# Patient Record
Sex: FEMALE | Race: WHITE | Employment: FULL TIME | URBAN - METROPOLITAN AREA
[De-identification: names, ages, dates, MRNs, and addresses within clinical notes are randomized per-mention and may not be internally consistent; named-entity substitution may affect disease eponyms.]

---

## 2017-07-19 ENCOUNTER — HOSPITAL ENCOUNTER (OUTPATIENT)
Dept: ULTRASOUND IMAGING | Age: 27
Discharge: HOME | End: 2017-07-19
Payer: COMMERCIAL

## 2017-07-19 DIAGNOSIS — R11.0 NAUSEA: ICD-10-CM

## 2017-07-19 PROCEDURE — 76700 US EXAM ABDOM COMPLETE: CPT

## 2020-04-19 ENCOUNTER — ANESTHESIA (OUTPATIENT)
Dept: SURGERY | Age: 30
End: 2020-04-19
Payer: COMMERCIAL

## 2020-04-19 ENCOUNTER — APPOINTMENT (OUTPATIENT)
Dept: GENERAL RADIOLOGY | Age: 30
End: 2020-04-19
Attending: EMERGENCY MEDICINE
Payer: COMMERCIAL

## 2020-04-19 ENCOUNTER — HOSPITAL ENCOUNTER (EMERGENCY)
Age: 30
Discharge: HOME | End: 2020-04-20
Attending: EMERGENCY MEDICINE
Payer: COMMERCIAL

## 2020-04-19 ENCOUNTER — ANESTHESIA EVENT (OUTPATIENT)
Dept: SURGERY | Age: 30
End: 2020-04-19
Payer: COMMERCIAL

## 2020-04-19 ENCOUNTER — APPOINTMENT (OUTPATIENT)
Dept: CT IMAGING | Age: 30
End: 2020-04-19
Attending: EMERGENCY MEDICINE
Payer: COMMERCIAL

## 2020-04-19 DIAGNOSIS — T17.208A FOREIGN BODY IN PHARYNX, INITIAL ENCOUNTER: Primary | ICD-10-CM

## 2020-04-19 LAB
ALBUMIN SERPL-MCNC: 3.9 G/DL (ref 3.5–5)
ALBUMIN/GLOB SERPL: 1.1 {RATIO} (ref 1.1–2.2)
ALP SERPL-CCNC: 59 U/L (ref 45–117)
ALT SERPL-CCNC: 17 U/L (ref 12–78)
ANION GAP SERPL CALC-SCNC: 12 MMOL/L (ref 5–15)
AST SERPL-CCNC: 12 U/L (ref 15–37)
BASOPHILS # BLD: 0 K/UL (ref 0–0.1)
BASOPHILS NFR BLD: 1 % (ref 0–1)
BILIRUB SERPL-MCNC: 0.6 MG/DL (ref 0.2–1)
BUN SERPL-MCNC: 15 MG/DL (ref 6–20)
BUN/CREAT SERPL: 15 (ref 12–20)
CALCIUM SERPL-MCNC: 8.9 MG/DL (ref 8.5–10.1)
CHLORIDE SERPL-SCNC: 108 MMOL/L (ref 97–108)
CO2 SERPL-SCNC: 25 MMOL/L (ref 21–32)
COMMENT, HOLDF: NORMAL
CREAT SERPL-MCNC: 0.98 MG/DL (ref 0.55–1.02)
DIFFERENTIAL METHOD BLD: NORMAL
EOSINOPHIL # BLD: 0.2 K/UL (ref 0–0.4)
EOSINOPHIL NFR BLD: 3 % (ref 0–7)
ERYTHROCYTE [DISTWIDTH] IN BLOOD BY AUTOMATED COUNT: 13.1 % (ref 11.5–14.5)
GLOBULIN SER CALC-MCNC: 3.7 G/DL (ref 2–4)
GLUCOSE SERPL-MCNC: 103 MG/DL (ref 65–100)
HCG UR QL: NEGATIVE
HCT VFR BLD AUTO: 39.5 % (ref 35–47)
HGB BLD-MCNC: 13.2 G/DL (ref 11.5–16)
IMM GRANULOCYTES # BLD AUTO: 0 K/UL (ref 0–0.04)
IMM GRANULOCYTES NFR BLD AUTO: 0 % (ref 0–0.5)
LYMPHOCYTES # BLD: 1.8 K/UL (ref 0.8–3.5)
LYMPHOCYTES NFR BLD: 33 % (ref 12–49)
MCH RBC QN AUTO: 28.4 PG (ref 26–34)
MCHC RBC AUTO-ENTMCNC: 33.4 G/DL (ref 30–36.5)
MCV RBC AUTO: 84.9 FL (ref 80–99)
MONOCYTES # BLD: 0.4 K/UL (ref 0–1)
MONOCYTES NFR BLD: 7 % (ref 5–13)
NEUTS SEG # BLD: 3.1 K/UL (ref 1.8–8)
NEUTS SEG NFR BLD: 56 % (ref 32–75)
NRBC # BLD: 0 K/UL (ref 0–0.01)
NRBC BLD-RTO: 0 PER 100 WBC
PLATELET # BLD AUTO: 281 K/UL (ref 150–400)
PMV BLD AUTO: 9.8 FL (ref 8.9–12.9)
POTASSIUM SERPL-SCNC: 3.7 MMOL/L (ref 3.5–5.1)
PROT SERPL-MCNC: 7.6 G/DL (ref 6.4–8.2)
RBC # BLD AUTO: 4.65 M/UL (ref 3.8–5.2)
SAMPLES BEING HELD,HOLD: NORMAL
SODIUM SERPL-SCNC: 145 MMOL/L (ref 136–145)
WBC # BLD AUTO: 5.4 K/UL (ref 3.6–11)

## 2020-04-19 PROCEDURE — 80053 COMPREHEN METABOLIC PANEL: CPT

## 2020-04-19 PROCEDURE — 76010000138 HC OR TIME 0.5 TO 1 HR: Performed by: OTOLARYNGOLOGY

## 2020-04-19 PROCEDURE — 77030026438 HC STYL ET INTUB CARD -A: Performed by: ANESTHESIOLOGY

## 2020-04-19 PROCEDURE — 85025 COMPLETE CBC W/AUTO DIFF WBC: CPT

## 2020-04-19 PROCEDURE — 99284 EMERGENCY DEPT VISIT MOD MDM: CPT

## 2020-04-19 PROCEDURE — 81025 URINE PREGNANCY TEST: CPT

## 2020-04-19 PROCEDURE — 76060000032 HC ANESTHESIA 0.5 TO 1 HR: Performed by: OTOLARYNGOLOGY

## 2020-04-19 PROCEDURE — 74011250636 HC RX REV CODE- 250/636: Performed by: NURSE ANESTHETIST, CERTIFIED REGISTERED

## 2020-04-19 PROCEDURE — 70490 CT SOFT TISSUE NECK W/O DYE: CPT

## 2020-04-19 PROCEDURE — 70360 X-RAY EXAM OF NECK: CPT

## 2020-04-19 PROCEDURE — 77030008684 HC TU ET CUF COVD -B: Performed by: ANESTHESIOLOGY

## 2020-04-19 PROCEDURE — 71045 X-RAY EXAM CHEST 1 VIEW: CPT

## 2020-04-19 PROCEDURE — 36415 COLL VENOUS BLD VENIPUNCTURE: CPT

## 2020-04-19 PROCEDURE — 76210000006 HC OR PH I REC 0.5 TO 1 HR: Performed by: OTOLARYNGOLOGY

## 2020-04-19 PROCEDURE — 76210000021 HC REC RM PH II 0.5 TO 1 HR: Performed by: OTOLARYNGOLOGY

## 2020-04-19 RX ORDER — SODIUM CHLORIDE, SODIUM LACTATE, POTASSIUM CHLORIDE, CALCIUM CHLORIDE 600; 310; 30; 20 MG/100ML; MG/100ML; MG/100ML; MG/100ML
125 INJECTION, SOLUTION INTRAVENOUS CONTINUOUS
Status: DISCONTINUED | OUTPATIENT
Start: 2020-04-19 | End: 2020-04-20 | Stop reason: HOSPADM

## 2020-04-19 RX ORDER — AMOXICILLIN 400 MG/5ML
10 POWDER, FOR SUSPENSION ORAL 2 TIMES DAILY
Qty: 200 ML | Refills: 0 | Status: SHIPPED | OUTPATIENT
Start: 2020-04-19 | End: 2020-04-29

## 2020-04-19 RX ORDER — MIDAZOLAM HYDROCHLORIDE 1 MG/ML
1 INJECTION, SOLUTION INTRAMUSCULAR; INTRAVENOUS AS NEEDED
Status: DISCONTINUED | OUTPATIENT
Start: 2020-04-19 | End: 2020-04-19 | Stop reason: HOSPADM

## 2020-04-19 RX ORDER — SODIUM CHLORIDE 0.9 % (FLUSH) 0.9 %
5-40 SYRINGE (ML) INJECTION AS NEEDED
Status: DISCONTINUED | OUTPATIENT
Start: 2020-04-19 | End: 2020-04-20 | Stop reason: HOSPADM

## 2020-04-19 RX ORDER — LEVOTHYROXINE SODIUM 50 UG/1
50 TABLET ORAL
COMMUNITY

## 2020-04-19 RX ORDER — SODIUM CHLORIDE 0.9 % (FLUSH) 0.9 %
5-40 SYRINGE (ML) INJECTION AS NEEDED
Status: DISCONTINUED | OUTPATIENT
Start: 2020-04-19 | End: 2020-04-19 | Stop reason: HOSPADM

## 2020-04-19 RX ORDER — DEXAMETHASONE SODIUM PHOSPHATE 4 MG/ML
INJECTION, SOLUTION INTRA-ARTICULAR; INTRALESIONAL; INTRAMUSCULAR; INTRAVENOUS; SOFT TISSUE AS NEEDED
Status: DISCONTINUED | OUTPATIENT
Start: 2020-04-19 | End: 2020-04-19 | Stop reason: HOSPADM

## 2020-04-19 RX ORDER — FENTANYL CITRATE 50 UG/ML
50 INJECTION, SOLUTION INTRAMUSCULAR; INTRAVENOUS AS NEEDED
Status: DISCONTINUED | OUTPATIENT
Start: 2020-04-19 | End: 2020-04-19 | Stop reason: HOSPADM

## 2020-04-19 RX ORDER — FENTANYL CITRATE 50 UG/ML
25 INJECTION, SOLUTION INTRAMUSCULAR; INTRAVENOUS
Status: DISCONTINUED | OUTPATIENT
Start: 2020-04-19 | End: 2020-04-20 | Stop reason: HOSPADM

## 2020-04-19 RX ORDER — SODIUM CHLORIDE, SODIUM LACTATE, POTASSIUM CHLORIDE, CALCIUM CHLORIDE 600; 310; 30; 20 MG/100ML; MG/100ML; MG/100ML; MG/100ML
INJECTION, SOLUTION INTRAVENOUS
Status: DISCONTINUED | OUTPATIENT
Start: 2020-04-19 | End: 2020-04-19 | Stop reason: HOSPADM

## 2020-04-19 RX ORDER — MIDAZOLAM HYDROCHLORIDE 1 MG/ML
0.5 INJECTION, SOLUTION INTRAMUSCULAR; INTRAVENOUS
Status: DISCONTINUED | OUTPATIENT
Start: 2020-04-19 | End: 2020-04-20 | Stop reason: HOSPADM

## 2020-04-19 RX ORDER — SODIUM CHLORIDE 9 MG/ML
25 INJECTION, SOLUTION INTRAVENOUS CONTINUOUS
Status: DISCONTINUED | OUTPATIENT
Start: 2020-04-19 | End: 2020-04-19 | Stop reason: HOSPADM

## 2020-04-19 RX ORDER — PROPOFOL 10 MG/ML
INJECTION, EMULSION INTRAVENOUS AS NEEDED
Status: DISCONTINUED | OUTPATIENT
Start: 2020-04-19 | End: 2020-04-19 | Stop reason: HOSPADM

## 2020-04-19 RX ORDER — SODIUM CHLORIDE 0.9 % (FLUSH) 0.9 %
5-40 SYRINGE (ML) INJECTION EVERY 8 HOURS
Status: DISCONTINUED | OUTPATIENT
Start: 2020-04-19 | End: 2020-04-20 | Stop reason: HOSPADM

## 2020-04-19 RX ORDER — FENTANYL CITRATE 50 UG/ML
INJECTION, SOLUTION INTRAMUSCULAR; INTRAVENOUS AS NEEDED
Status: DISCONTINUED | OUTPATIENT
Start: 2020-04-19 | End: 2020-04-19 | Stop reason: HOSPADM

## 2020-04-19 RX ORDER — MORPHINE SULFATE 10 MG/ML
2 INJECTION, SOLUTION INTRAMUSCULAR; INTRAVENOUS
Status: DISCONTINUED | OUTPATIENT
Start: 2020-04-19 | End: 2020-04-20 | Stop reason: HOSPADM

## 2020-04-19 RX ORDER — SODIUM CHLORIDE 0.9 % (FLUSH) 0.9 %
5-40 SYRINGE (ML) INJECTION EVERY 8 HOURS
Status: DISCONTINUED | OUTPATIENT
Start: 2020-04-19 | End: 2020-04-19 | Stop reason: HOSPADM

## 2020-04-19 RX ORDER — HYDROMORPHONE HYDROCHLORIDE 1 MG/ML
0.2 INJECTION, SOLUTION INTRAMUSCULAR; INTRAVENOUS; SUBCUTANEOUS
Status: DISCONTINUED | OUTPATIENT
Start: 2020-04-19 | End: 2020-04-20 | Stop reason: HOSPADM

## 2020-04-19 RX ORDER — DIPHENHYDRAMINE HYDROCHLORIDE 50 MG/ML
12.5 INJECTION, SOLUTION INTRAMUSCULAR; INTRAVENOUS AS NEEDED
Status: ACTIVE | OUTPATIENT
Start: 2020-04-19 | End: 2020-04-19

## 2020-04-19 RX ORDER — ACETAMINOPHEN 325 MG/1
650 TABLET ORAL ONCE
Status: DISCONTINUED | OUTPATIENT
Start: 2020-04-19 | End: 2020-04-19 | Stop reason: HOSPADM

## 2020-04-19 RX ORDER — OXYCODONE HYDROCHLORIDE 5 MG/1
5 TABLET ORAL AS NEEDED
Status: DISCONTINUED | OUTPATIENT
Start: 2020-04-19 | End: 2020-04-20 | Stop reason: HOSPADM

## 2020-04-19 RX ORDER — ONDANSETRON 2 MG/ML
4 INJECTION INTRAMUSCULAR; INTRAVENOUS AS NEEDED
Status: DISCONTINUED | OUTPATIENT
Start: 2020-04-19 | End: 2020-04-20 | Stop reason: HOSPADM

## 2020-04-19 RX ORDER — SODIUM CHLORIDE, SODIUM LACTATE, POTASSIUM CHLORIDE, CALCIUM CHLORIDE 600; 310; 30; 20 MG/100ML; MG/100ML; MG/100ML; MG/100ML
25 INJECTION, SOLUTION INTRAVENOUS CONTINUOUS
Status: DISCONTINUED | OUTPATIENT
Start: 2020-04-19 | End: 2020-04-19 | Stop reason: HOSPADM

## 2020-04-19 RX ORDER — LIDOCAINE HYDROCHLORIDE 10 MG/ML
0.1 INJECTION, SOLUTION EPIDURAL; INFILTRATION; INTRACAUDAL; PERINEURAL AS NEEDED
Status: DISCONTINUED | OUTPATIENT
Start: 2020-04-19 | End: 2020-04-19 | Stop reason: HOSPADM

## 2020-04-19 RX ADMIN — FENTANYL CITRATE 25 MCG: 50 INJECTION, SOLUTION INTRAMUSCULAR; INTRAVENOUS at 23:19

## 2020-04-19 RX ADMIN — PROPOFOL 200 MG: 10 INJECTION, EMULSION INTRAVENOUS at 22:33

## 2020-04-19 RX ADMIN — PROPOFOL 100 MG: 10 INJECTION, EMULSION INTRAVENOUS at 22:38

## 2020-04-19 RX ADMIN — SODIUM CHLORIDE, POTASSIUM CHLORIDE, SODIUM LACTATE AND CALCIUM CHLORIDE: 600; 310; 30; 20 INJECTION, SOLUTION INTRAVENOUS at 22:27

## 2020-04-19 RX ADMIN — FENTANYL CITRATE 50 MCG: 50 INJECTION, SOLUTION INTRAMUSCULAR; INTRAVENOUS at 22:49

## 2020-04-19 RX ADMIN — PROPOFOL 200 MG: 10 INJECTION, EMULSION INTRAVENOUS at 22:28

## 2020-04-19 RX ADMIN — DEXAMETHASONE SODIUM PHOSPHATE 4 MG: 4 INJECTION, SOLUTION INTRAMUSCULAR; INTRAVENOUS at 22:56

## 2020-04-19 RX ADMIN — FENTANYL CITRATE 25 MCG: 50 INJECTION, SOLUTION INTRAMUSCULAR; INTRAVENOUS at 23:23

## 2020-04-19 NOTE — ED TRIAGE NOTES
Patient was eating grilled chicken, and feels as though something is stuck in her throat. Reports pain in her throat, which makes her not want to swallow, but is able to swallow her saliva.

## 2020-04-19 NOTE — ED NOTES
Report and appropriate paperwork provided to [facility] crew; patient in no distress leaving department on stretcher.

## 2020-04-19 NOTE — ED NOTES
TRANSFER - OUT REPORT:    Verbal report given to Edwige Lopes RN (name) on Devan Jolly  being transferred to ER(unit) for routine progression of care       Report consisted of patients Situation, Background, Assessment and   Recommendations(SBAR). Information from the following report(s) SBAR, ED Summary and Intake/Output was reviewed with the receiving nurse. Lines:   Peripheral IV 04/19/20 Left Forearm (Active)   Site Assessment Clean, dry, & intact 4/19/2020  6:45 PM   Phlebitis Assessment 0 4/19/2020  6:45 PM   Infiltration Assessment 0 4/19/2020  6:45 PM   Dressing Status Clean, dry, & intact 4/19/2020  6:45 PM   Dressing Type Transparent 4/19/2020  6:45 PM   Hub Color/Line Status Pink;Flushed 4/19/2020  6:45 PM   Action Taken Blood drawn 4/19/2020  6:45 PM        Opportunity for questions and clarification was provided.

## 2020-04-19 NOTE — ED PROVIDER NOTES
Please note that this dictation was completed with [employer], the computer voice recognition software.  Quite often unanticipated grammatical, syntax, homophones, and other interpretive errors are inadvertently transcribed by the computer software.  Please disregard these errors.  Please excuse any errors that have escaped final proofreading. 26-year-old female is currently breast-feeding \"was eating chicken breast my  grilled the very first bite something suddenly got stuck in my throat I can feel it it still there now it feels like it is back on the right side back by behind the base of my tongue. I cannot reach it if you like I can almost reach it with my finger but I could not so decided to come here. Is not taking anything for this she has not had previous episodes of this was concerned initially it may have been a chicken bone however \"there are no bones and chicken breasts. \"  Patient drove herself to the ER.    pt denies HA, vison changes, diff swallowing, CP, SOB, Abd pain, F/Ch, N/V, D/Cons or other current systemic complaints           No past medical history on file. No past surgical history on file. No family history on file.     Social History     Socioeconomic History    Marital status: SINGLE     Spouse name: Not on file    Number of children: Not on file    Years of education: Not on file    Highest education level: Not on file   Occupational History    Not on file   Social Needs    Financial resource strain: Not on file    Food insecurity     Worry: Not on file     Inability: Not on file    Transportation needs     Medical: Not on file     Non-medical: Not on file   Tobacco Use    Smoking status: Not on file   Substance and Sexual Activity    Alcohol use: Not on file    Drug use: Not on file    Sexual activity: Not on file   Lifestyle    Physical activity     Days per week: Not on file     Minutes per session: Not on file    Stress: Not on file   Relationships    Social connections     Talks on phone: Not on file     Gets together: Not on file     Attends [employer] service: Not on file     Active member of club or organization: Not on file     Attends meetings of clubs or organizations: Not on file     Relationship status: Not on file    Intimate partner violence     Fear of current or ex partner: Not on file     Emotionally abused: Not on file     Physically abused: Not on file     Forced sexual activity: Not on file   Other Topics Concern    Not on file   Social History Narrative    Not on file         ALLERGIES: Patient has no allergy information on record. Review of Systems   Constitutional: Negative for appetite change, chills and fever. HENT: Positive for sore throat. Negative for trouble swallowing and voice change. Eyes: Negative for visual disturbance. Respiratory: Negative for cough, chest tightness and shortness of breath. Cardiovascular: Negative for chest pain, palpitations and leg swelling. Gastrointestinal: Negative for abdominal pain, diarrhea, nausea and vomiting. Genitourinary: Negative for dysuria. Skin: Negative for rash. Neurological: Negative for facial asymmetry and speech difficulty. All other systems reviewed and are negative. There were no vitals filed for this visit. Physical Exam  Vitals signs and nursing note reviewed. Constitutional:       General: She is not in acute distress. Appearance: Normal appearance. She is well-developed. She is not ill-appearing, toxic-appearing or diaphoretic. Comments: NAD, AxOx4, speaking in complete sentences    Airway intact  No stridor, swelling/ controlling secrections   HENT:      Head: Normocephalic and atraumatic. Comments: Cn intact    Unable to visualize FB/ wound/ bleeding;      Right Ear: External ear normal.      Left Ear: External ear normal.      Nose: Nose normal. No congestion.       Mouth/Throat:      Mouth: Mucous membranes are moist. Pharynx: Oropharynx is clear. No oropharyngeal exudate or posterior oropharyngeal erythema. Eyes:      General: No scleral icterus. Right eye: No discharge. Conjunctiva/sclera: Conjunctivae normal.      Pupils: Pupils are equal, round, and reactive to light. Neck:      Musculoskeletal: Normal range of motion and neck supple. Vascular: No JVD. Trachea: No tracheal deviation. Cardiovascular:      Rate and Rhythm: Normal rate and regular rhythm. Pulses: Normal pulses. Heart sounds: Normal heart sounds. No murmur. No friction rub. No gallop. Pulmonary:      Effort: Pulmonary effort is normal. No respiratory distress. Breath sounds: Normal breath sounds. No stridor. No wheezing, rhonchi or rales. Chest:      Chest wall: No tenderness. Abdominal:      General: Bowel sounds are normal.      Palpations: Abdomen is soft. There is no mass. Tenderness: There is no abdominal tenderness. There is no guarding or rebound. Hernia: No hernia is present. Genitourinary:     Vagina: No vaginal discharge. Musculoskeletal: Normal range of motion. General: No swelling, tenderness, deformity or signs of injury. Right lower leg: No edema. Left lower leg: No edema. Skin:     General: Skin is warm and dry. Capillary Refill: Capillary refill takes less than 2 seconds. Coloration: Skin is not jaundiced or pale. Findings: No bruising, erythema, lesion or rash. Neurological:      General: No focal deficit present. Mental Status: She is alert and oriented to person, place, and time. Cranial Nerves: No cranial nerve deficit. Sensory: No sensory deficit. Motor: No weakness or abnormal muscle tone.       Coordination: Coordination normal.      Gait: Gait normal.      Deep Tendon Reflexes: Reflexes normal.      Comments: pt has motor/ CV/ Sensation grossly intact to all extremities, R = L in strength;   Psychiatric:         Behavior: Behavior normal.         Thought Content: Thought content normal.          MDM       Procedures    CONSULT  NOTE  6:45 PM  Holden Gamino MD spoke with Dr Jaz Gomez. Specialty: ENT  Discussed pt's hx, disposition, and available diagnostic and imaging results. Reviewed care plans. Consulting physician agrees with plans as outlined 'Please transfer to [facility] obtain the CT there/ we will see her there' . 6:50 PM  Dr Aravind Hendrix aware/ accepts the Pt in transfer;        9:47 PM   Case, imaging results, and plan discussed with Dr. Jaz Gomez, ENT. Patient to operating room for management of her foreign body. Patient is being admitted to the hospital.  The results of their tests and reasons for their admission have been discussed with them and/or available family. They convey agreement and understanding for the need to be admitted and for their admission diagnosis. Consultation will be made now with the inpatient physician for hospitalization.

## 2020-04-20 VITALS
BODY MASS INDEX: 23.32 KG/M2 | TEMPERATURE: 98 F | WEIGHT: 148.59 LBS | HEIGHT: 67 IN | SYSTOLIC BLOOD PRESSURE: 116 MMHG | DIASTOLIC BLOOD PRESSURE: 72 MMHG | RESPIRATION RATE: 14 BRPM | OXYGEN SATURATION: 99 % | HEART RATE: 67 BPM

## 2020-04-20 NOTE — CONSULTS
Ears/Nose/Throat Consult    Subjective:     Date of Consultation:  2020    Referring Physician: Sugey Medeiros    History of Present Illness:   Patient is a 26 y.o.  female who is being seen for foreign body hypopharynx on the right. she  was admitted to the hosptial for evaluation, imaging, removal as indicated. Past Medical History:   Diagnosis Date    Hypothyroidism       History reviewed. No pertinent family history. Social History     Tobacco Use    Smoking status: Never Smoker    Smokeless tobacco: Never Used   Substance Use Topics    Alcohol use: Never     Frequency: Never     Past Surgical History:   Procedure Laterality Date    HX  SECTION      HX TONSILLECTOMY        No current facility-administered medications for this encounter. Current Outpatient Medications   Medication Sig    levothyroxine (SYNTHROID) 50 mcg tablet Take 50 mcg by mouth Daily (before breakfast).  PNV85-iron-folic acid-dha-fish 53-41-2-320 mg cap Take  by mouth. Allergies   Allergen Reactions    Codeine Nausea and Vomiting        Review of Systems: chart reviewed    Objective:     Patient Vitals for the past 8 hrs:   BP Temp Pulse Resp SpO2 Height Weight   20 146/79 97.5 °F (36.4 °C) 67 18 97 %     20 1931 115/78 97.8 °F (36.6 °C) 68 18 98 %     20 1815 110/77 97 °F (36.1 °C) 65 16 96 % 5' 7\" (1.702 m) 67.4 kg (148 lb 9.4 oz)     Temp (24hrs), Av.4 °F (36.3 °C), Min:97 °F (36.1 °C), Max:97.8 °F (36.6 °C)    No intake/output data recorded. Assessment:     Foreign body right prevertebral tissue in hypopharynx. Recommend removal in OR. Plan:     CT , lab reviewed. Care coordinated with nursing supervisor, OR team. Foreign body right prevertebral tissue in hypopharynx. Recommend removal in OR.        Signed By: Philomena Edwards MD     2020

## 2020-04-20 NOTE — H&P
Ears/Nose/Throat Consult    Subjective:     Date of Evaluation:  2020        History of Present Illness:   Patient is a 26 y. o.female who is being seen for foreign body throat. [facility] Past Medical History:   Diagnosis Date    Hypothyroidism       History reviewed. No pertinent family history.    Social History     Tobacco Use    Smoking status: Never Smoker    Smokeless tobacco: Never Used   Substance Use Topics    Alcohol use: Never     Frequency: Never     Past Surgical History:   Procedure Laterality Date    HX  SECTION      HX TONSILLECTOMY        Current Facility-Administered Medications   Medication Dose Route Frequency    lactated Ringers infusion  25 mL/hr IntraVENous CONTINUOUS    0.9% sodium chloride infusion  25 mL/hr IntraVENous CONTINUOUS    sodium chloride (NS) flush 5-40 mL  5-40 mL IntraVENous Q8H    sodium chloride (NS) flush 5-40 mL  5-40 mL IntraVENous PRN    lidocaine (PF) (XYLOCAINE) 10 mg/mL (1 %) injection 0.1 mL  0.1 mL SubCUTAneous PRN    fentaNYL citrate (PF) injection 50 mcg  50 mcg IntraVENous PRN    midazolam (VERSED) injection 1 mg  1 mg IntraVENous PRN    midazolam (VERSED) injection 1 mg  1 mg IntraVENous PRN    acetaminophen (TYLENOL) tablet 650 mg  650 mg Oral ONCE    lactated Ringers infusion  125 mL/hr IntraVENous CONTINUOUS    sodium chloride (NS) flush 5-40 mL  5-40 mL IntraVENous Q8H    sodium chloride (NS) flush 5-40 mL  5-40 mL IntraVENous PRN    oxyCODONE IR (ROXICODONE) tablet 5 mg  5 mg Oral PRN    fentaNYL citrate (PF) injection 25 mcg  25 mcg IntraVENous Multiple    morphine 10 mg/ml injection 2 mg  2 mg IntraVENous Multiple    HYDROmorphone (PF) (DILAUDID) injection 0.2 mg  0.2 mg IntraVENous Multiple    ondansetron (ZOFRAN) injection 4 mg  4 mg IntraVENous PRN    midazolam (VERSED) injection 0.5 mg  0.5 mg IntraVENous Q5MIN PRN    diphenhydrAMINE (BENADRYL) injection 12.5 mg  12.5 mg IntraVENous PRN    meperidine (DEMEROL) injection 12.5 mg  12.5 mg IntraVENous PRN     Facility-Administered Medications Ordered in Other Encounters   Medication Dose Route Frequency    fentaNYL citrate (PF) injection    PRN    propofoL (DIPRIVAN) 10 mg/mL injection   IntraVENous PRN    dexamethasone (DECADRON) 4 mg/mL injection    PRN    lactated Ringers infusion   IntraVENous CONTINUOUS      Allergies   Allergen Reactions    Codeine Nausea and Vomiting        Review of Systems:  Dysphagia, odynophagia, no chest pain, no hemoptysis, no hematemisis, no stridor    Objective:     Patient Vitals for the past 8 hrs:   BP Temp Pulse Resp SpO2 Height Weight   20 102/72 97.5 °F (36.4 °C) 63 17 97 %     20 146/79 97.5 °F (36.4 °C) 67 18 97 %     20 1931 115/78 97.8 °F (36.6 °C) 68 18 98 %     20 1815 110/77 97 °F (36.1 °C) 65 16 96 % 5' 7\" (1.702 m) 67.4 kg (148 lb 9.4 oz)     Temp (24hrs), Av.5 °F (36.4 °C), Min:97 °F (36.1 °C), Max:97.8 °F (36.6 °C)    No intake/output data recorded. Physical Exam:   General  General Appearance- Well nourished adult in no apparent distress who is alert and cooperative. Gait- Normal. Voice- Normal voice and communication. HEENT  Head: Normally developed without evidence of trauma or lesions. Face:  Skin:  Normal color, texture, and turgor. There are no lesions of concern nor swelling or induration. Lips- normal.  Facial Nerve- Bilateral- function is equal and symmetrical with no deficit. Ear: Auricle- Left- Normal development without sinus pit, cyst or any other lesion. Right- Normal development without sinus pit, cyst or any other lesion  Auditory Canal (otoscopic examination)  Left- clean, without edema, discharge, or lesions. Right  clean, without edema, discharge, or lesions. Mastoid- Left- No erythema, edema, tenderness, or protrusion of the auricle.   Right- No erythema, edema, tenderness, or protrusion of the auricle. Nose: External Nose- Normally developed without lesion. Nasal Mucosa- moist and pink without erythema, edema, or lesions. Nasal septum- Caudally the septum is relatively straight without lesion. Turbinates- Bilateral- The turbinates are without hypertrophy, edema, or lesion. Sinuses-  All sinuses are nontender to percussion. Oral Cavity/OropharynxDentition- Normal dentition for age witho no evidence of discoloration, inflammation, or infection. Oral Mucosa: moist without erythema or lesions. Tongue- no lesions or palpable masses. Floor of mouth- soft without palpable masses or mucosal lesion. Palate and uvula- Palate is without cleft and the uvula is not bifid. Soft palate elevates symmetrically. Tonsils- Bilateral -Normal in size without exudates or erythema. Salivary Ducts-  Ducts appear to be normal.  Throat: Pharynx- Normal mucosal lining without erythema or mass. Larynx: difficult to examine directly. Neck:-- Trachea- midline with normal laryngeal framework with no crepitus. Salivary Glands- normal to palpation without nodules or tenderness. Thyroid- normal to palpation without mass or irregularity. Lymph Nodes- No palpable adenopathy or masses. Range of Motion- good in all directions, with good anterior-posterior and lateral flexion and rotation. Chest and Lung Exam: Normal respiratory effort with no wheezing, retractions, or rales. Cardiovascular: Regular rate and rhythm. Normal peripheral pulses without bruits. Neurologic exam: Alert and oriented to person, place, and time. Cranial Nerves II-XII intact bilaterally. Pupils equally round, reactive to light. Extraocular movements are intact bilaterally. No baseline nystagmus. Smooth pursuit grossly intact. Assessment:     Foreign body hypopharynx on right.  Likely metal wire    Hospital Problems  Date Reviewed: 4/19/2020          Codes Class Noted POA    Foreign body of throat ICD-10-CM: W99.868U  ICD-9-CM: 933.0 4/19/2020 Unknown                Plan: To or for removal of foreign body. Risks/benefits/imponderables/alternatives discussed with patient. Patient requests surgery.       Signed By: Kayleen lFores MD     April 19, 2020

## 2020-04-20 NOTE — BRIEF OP NOTE
Brief Postoperative Note    Patient: Nataliia Polk  YOB: 1990  MRN: [id number]    Date of Procedure: 4/19/2020     Pre-Op Diagnosis: FOREIGN OBJECT NEAR LARYNX    Post-Op Diagnosis:FOREIGN OBJECT NEAR LARYNX      Procedure(s):  LARYNGOSCOPY DIRECT RIGID REMOVAL OF FOREIGN OBJECT    Surgeon(s):  Juan Carter MD    Surgical Assistant: None    Anesthesia: General     Estimated Blood Loss (mL): 0cc    Complications:none    Specimens: * No specimens in log *     Implants: * No implants in log *    Drains: * No LDAs found *    Findings: foreign body removed hypopharynx. Patient tolerated the procedure well. Stable to recovery room.     Electronically Signed by Ren Bowen MD on 4/19/2020 at 11:17 PM

## 2020-04-20 NOTE — ED TRIAGE NOTES
Arrives as a transfer from [facility] ED after scan revealed foreign body in throat. Initial complaint was feeling something sharp stuck in her throat upon eating a piece of grilled chicken. Arrives a&o x 4, ambulatory, with no difficulty swallowing and VS at this time.

## 2020-04-20 NOTE — ANESTHESIA POSTPROCEDURE EVALUATION
Post-Anesthesia Evaluation and Assessment    Patient: Pablo Bobo MRN: [id number]  SSN: [id number]    YOB: 1990  Age: 26 y.o. Sex: female       Cardiovascular Function/Vital Signs  Visit Vitals  /70   Pulse 64   Temp 36.5 °C (97.7 °F)   Resp 12   Ht 5' 7\" (1.702 m)   Wt 67.4 kg (148 lb 9.4 oz)   SpO2 98%   BMI 23.27 kg/m²       Patient is status post General anesthesia for Procedure(s):  LARYNGOSCOPY DIRECT RIGID REMOVAL OF FOREIGN OBJECT. Nausea/Vomiting: None    Postoperative hydration reviewed and adequate. Pain:  Pain Scale 1: Numeric (0 - 10) (04/19/20 2322)  Pain Intensity 1: 4 (04/19/20 2322)   Managed    Neurological Status:   Neuro (WDL): Within Defined Limits (04/19/20 2321)   At baseline    Mental Status and Level of Consciousness: Alert and oriented to person, place, and time    Pulmonary Status:   O2 Device: Room air (04/19/20 2322)   Adequate oxygenation and airway patent    Complications related to anesthesia: None    Post-anesthesia assessment completed. No concerns    Signed By: Camille Opitz, MD     April 20, 2020              Procedure(s):  LARYNGOSCOPY DIRECT RIGID REMOVAL OF FOREIGN OBJECT. general    <BSHSIANPOST>    Vitals Value Taken Time   /70 4/19/2020 11:45 PM   Temp     Pulse 73 4/19/2020 11:55 PM   Resp 16 4/19/2020 11:55 PM   SpO2 96 % 4/19/2020 11:55 PM   Vitals shown include unvalidated device data.

## 2020-04-20 NOTE — DISCHARGE INSTRUCTIONS
Clear liquids 24 hours,  Advance to soft diet, regular as tolerated. Tylenol liquid for pain. Return to ER for increasing chest pain, increasing shortness of breath, high fever. If these symptoms occur during daytime hours, call our clinic nurses at [phone]. If there are concerns afterhours, please call [phone] and the answering service will help you contact the physician on call. Instructions Following Ambulatory Surgery    Activity  · As tolerated and directed by your doctor  · Bathe or shower as directed by your doctor    Diet  · Clear liquids for 24 hours then advance to soft diet, then to regular diet  · Advance to regular diet on second day, unless your doctor orders otherwise  · If nausea and vomiting continues, call your doctor    Pain  · Take pain medication as directed by your doctor, tylenol or ibuprofen for pain  ·  Call your doctor if pain is NOT relieved by medication  · DO NOT take aspirin or blood thinners until directed by your doctor      Follow-Up Phone Calls  · Will be made nursing staff  · If you have any problems, call your doctor as needed    Call your doctor if  · Excessive bleeding that does not stop  · Temperature of 101 degrees F or above,   · Shortness of breath or chest pain    After Anesthesia  · For the first 24 hours: DO NOT Drive, Drink alcoholic beverages, or Make important decisions  · Be aware of dizziness following anesthesia and while taking pain medication    Medication changes have been made by your doctor; see Universal Medication form  Continue home medications as previously prescribed with the addition of:     Other Instructions:    Appointment date/time: follow up in 1 week, but not necessary if feeling back to normal    Your doctor's phone number:  See above

## 2020-04-20 NOTE — OP NOTES
Operative Note    Patient: Jd Banda  YOB: 1990  MRN: [id number]    Date of Procedure: 4/19/2020     Pre-Op Diagnosis: FOREIGN OBJECT NEAR LARYNX    Post-Op Diagnosis:FOREIGN OBJECT NEAR LARYNX      Procedure(s):  LARYNGOSCOPY DIRECT RIGID REMOVAL OF FOREIGN OBJECT    A head wrap was placed. I palpated the tongue, floor of mouth, tonsils, and base of tongue. A maxillary tooth guard was inserted. I then performed a direct laryngoscopy with the laryngoscope. I examined the tonsils, base of tongue, valleculae, epiglottis, larynx, and pyriform sinuses. The foreign body was identified in the hypopharynx and was removed with cup forceps. Tamponade controlled minor oozing    Suction was used to remove any secretions or blood from the area and the subglottis. The laryngoscope was removed. The mouth guard was removed. The patient was extubated and stable to the recovery room. Specimine: none  Complications:none  Findings: foreign body removed hypopharynx. Patient tolerated the procedure well. Stable to recovery room.

## 2020-04-20 NOTE — PROGRESS NOTES
Foreign body idenitified hypopharynx, removed with direct visualization. Plan to recovery, d/c when criteria met.

## 2020-04-20 NOTE — ED NOTES
TRANSFER - OUT REPORT:    Verbal report given to [facility], RN(name) on Delene Barges  being transferred to OR(unit) for ordered procedure       Report consisted of patients Situation, Background, Assessment and   Recommendations(SBAR). Information from the following report(s) SBAR, ED Summary, [facility] and Recent Results was reviewed with the receiving nurse. Lines:   Peripheral IV 04/19/20 Left Forearm (Active)   Site Assessment Clean, dry, & intact 4/19/2020  6:45 PM   Phlebitis Assessment 0 4/19/2020  6:45 PM   Infiltration Assessment 0 4/19/2020  6:45 PM   Dressing Status Clean, dry, & intact 4/19/2020  6:45 PM   Dressing Type Transparent 4/19/2020  6:45 PM   Hub Color/Line Status Pink;Flushed 4/19/2020  6:45 PM   Action Taken Blood drawn 4/19/2020  6:45 PM        Opportunity for questions and clarification was provided.       Patient transported with:   "[employer]

## 2020-04-20 NOTE — ANESTHESIA PREPROCEDURE EVALUATION
Relevant Problems   No relevant active problems       Anesthetic History   No history of anesthetic complications            Review of Systems / Medical History  Patient summary reviewed, nursing notes reviewed and pertinent labs reviewed    Pulmonary  Within defined limits                 Neuro/Psych   Within defined limits           Cardiovascular  Within defined limits                Exercise tolerance: >4 METS     GI/Hepatic/Renal  Within defined limits              Endo/Other      Hypothyroidism       Other Findings              Physical Exam    Airway  Mallampati: II  TM Distance: > 6 cm  Neck ROM: normal range of motion   Mouth opening: Normal     Cardiovascular  Regular rate and rhythm,  S1 and S2 normal,  no murmur, click, rub, or gallop             Dental  No notable dental hx       Pulmonary  Breath sounds clear to auscultation               Abdominal  GI exam deferred       Other Findings            Anesthetic Plan    ASA: 2, emergent  Anesthesia type: general          Induction: Intravenous  Anesthetic plan and risks discussed with: Patient

## 2022-03-19 PROBLEM — T17.208A: Status: ACTIVE | Noted: 2020-04-19

## 2022-04-22 ENCOUNTER — TRANSCRIBE ORDER (OUTPATIENT)
Dept: GENERAL RADIOLOGY | Age: 32
End: 2022-04-22

## 2022-04-22 ENCOUNTER — HOSPITAL ENCOUNTER (OUTPATIENT)
Dept: GENERAL RADIOLOGY | Age: 32
Discharge: HOME | End: 2022-04-22
Payer: COMMERCIAL

## 2022-04-22 DIAGNOSIS — J45.998 ALLERGIC-INFECTIVE ASTHMA: Primary | ICD-10-CM

## 2022-04-22 DIAGNOSIS — J45.998 ALLERGIC-INFECTIVE ASTHMA: ICD-10-CM

## 2022-04-22 PROCEDURE — 71046 X-RAY EXAM CHEST 2 VIEWS: CPT | Performed by: INTERNAL MEDICINE

## 2025-07-22 ENCOUNTER — TELEPHONE (OUTPATIENT)
Facility: HOSPITAL | Age: 35
End: 2025-07-22

## 2025-07-22 NOTE — TELEPHONE ENCOUNTER
Received request to review referral for Dr. Davis. Reviewed records and called pt. Discussed having additional work up through breast surgeon (Dr. Kemp) prior to appointment with Dr. Davis. Pt agrees with this plan. Provided her my direct phone and fax numbers. Will assist with scheduling consult with Dr. Davis if/when it is necessary.           SKYLA Kaminski, RN, CBCN  Breast Cancer Nurse Navigator    [facility]  [address]   [address]  [place] [address]  W: [phone]  F: [phone]  [email or web address]   Good Help to Those in Need®

## (undated) DEVICE — Device

## (undated) DEVICE — STRAP,POSITIONING,KNEE/BODY,FOAM,4X60": Brand: MEDLINE

## (undated) DEVICE — DRAPE MICSCP W46XL120IN FOR ZEISS MD

## (undated) DEVICE — INFECTION CONTROL KIT SYS